# Patient Record
Sex: MALE | Race: OTHER | NOT HISPANIC OR LATINO
[De-identification: names, ages, dates, MRNs, and addresses within clinical notes are randomized per-mention and may not be internally consistent; named-entity substitution may affect disease eponyms.]

---

## 2020-12-30 ENCOUNTER — LABORATORY RESULT (OUTPATIENT)
Age: 45
End: 2020-12-30

## 2020-12-30 ENCOUNTER — APPOINTMENT (OUTPATIENT)
Dept: INTERNAL MEDICINE | Facility: CLINIC | Age: 45
End: 2020-12-30

## 2020-12-30 ENCOUNTER — NON-APPOINTMENT (OUTPATIENT)
Age: 45
End: 2020-12-30

## 2020-12-30 ENCOUNTER — OUTPATIENT (OUTPATIENT)
Dept: OUTPATIENT SERVICES | Facility: HOSPITAL | Age: 45
LOS: 1 days | End: 2020-12-30
Payer: SELF-PAY

## 2020-12-30 VITALS
BODY MASS INDEX: 29.06 KG/M2 | HEART RATE: 79 BPM | SYSTOLIC BLOOD PRESSURE: 130 MMHG | HEIGHT: 60 IN | OXYGEN SATURATION: 97 % | WEIGHT: 148 LBS | DIASTOLIC BLOOD PRESSURE: 80 MMHG

## 2020-12-30 DIAGNOSIS — I10 ESSENTIAL (PRIMARY) HYPERTENSION: ICD-10-CM

## 2020-12-30 DIAGNOSIS — Z00.00 ENCOUNTER FOR GENERAL ADULT MEDICAL EXAMINATION W/OUT ABNORMAL FINDINGS: ICD-10-CM

## 2020-12-30 PROCEDURE — 83036 HEMOGLOBIN GLYCOSYLATED A1C: CPT

## 2020-12-30 PROCEDURE — G0463: CPT

## 2020-12-30 PROCEDURE — 80053 COMPREHEN METABOLIC PANEL: CPT

## 2020-12-30 PROCEDURE — 85027 COMPLETE CBC AUTOMATED: CPT

## 2020-12-30 PROCEDURE — 80061 LIPID PANEL: CPT

## 2020-12-31 LAB
A1C WITH ESTIMATED AVERAGE GLUCOSE RESULT: 5.3 % — SIGNIFICANT CHANGE UP (ref 4–5.6)
ALBUMIN SERPL ELPH-MCNC: 4.4 G/DL — SIGNIFICANT CHANGE UP (ref 3.3–5)
ALP SERPL-CCNC: 87 U/L — SIGNIFICANT CHANGE UP (ref 40–120)
ALT FLD-CCNC: 26 U/L — SIGNIFICANT CHANGE UP (ref 10–45)
ANION GAP SERPL CALC-SCNC: 15 MMOL/L — SIGNIFICANT CHANGE UP (ref 5–17)
AST SERPL-CCNC: 21 U/L — SIGNIFICANT CHANGE UP (ref 10–40)
BILIRUB SERPL-MCNC: 0.2 MG/DL — SIGNIFICANT CHANGE UP (ref 0.2–1.2)
BUN SERPL-MCNC: 12 MG/DL — SIGNIFICANT CHANGE UP (ref 7–23)
CALCIUM SERPL-MCNC: 9.1 MG/DL — SIGNIFICANT CHANGE UP (ref 8.4–10.5)
CHLORIDE SERPL-SCNC: 102 MMOL/L — SIGNIFICANT CHANGE UP (ref 96–108)
CHOLEST SERPL-MCNC: 196 MG/DL — SIGNIFICANT CHANGE UP
CO2 SERPL-SCNC: 22 MMOL/L — SIGNIFICANT CHANGE UP (ref 22–31)
CREAT SERPL-MCNC: 0.91 MG/DL — SIGNIFICANT CHANGE UP (ref 0.5–1.3)
ESTIMATED AVERAGE GLUCOSE: 105 MG/DL — SIGNIFICANT CHANGE UP (ref 68–114)
GLUCOSE SERPL-MCNC: 71 MG/DL — SIGNIFICANT CHANGE UP (ref 70–99)
HCT VFR BLD CALC: 47.5 % — SIGNIFICANT CHANGE UP (ref 39–50)
HDLC SERPL-MCNC: 37 MG/DL — LOW
HGB BLD-MCNC: 15 G/DL — SIGNIFICANT CHANGE UP (ref 13–17)
LIPID PNL WITH DIRECT LDL SERPL: 106 MG/DL — HIGH
MCHC RBC-ENTMCNC: 30.2 PG — SIGNIFICANT CHANGE UP (ref 27–34)
MCHC RBC-ENTMCNC: 31.6 GM/DL — LOW (ref 32–36)
MCV RBC AUTO: 95.6 FL — SIGNIFICANT CHANGE UP (ref 80–100)
NON HDL CHOLESTEROL: 158 MG/DL — HIGH
PLATELET # BLD AUTO: 232 K/UL — SIGNIFICANT CHANGE UP (ref 150–400)
POTASSIUM SERPL-MCNC: 5.2 MMOL/L — SIGNIFICANT CHANGE UP (ref 3.5–5.3)
POTASSIUM SERPL-SCNC: 5.2 MMOL/L — SIGNIFICANT CHANGE UP (ref 3.5–5.3)
PROT SERPL-MCNC: 7.1 G/DL — SIGNIFICANT CHANGE UP (ref 6–8.3)
RBC # BLD: 4.97 M/UL — SIGNIFICANT CHANGE UP (ref 4.2–5.8)
RBC # FLD: 12.4 % — SIGNIFICANT CHANGE UP (ref 10.3–14.5)
SODIUM SERPL-SCNC: 139 MMOL/L — SIGNIFICANT CHANGE UP (ref 135–145)
TRIGL SERPL-MCNC: 259 MG/DL — HIGH
WBC # BLD: 5.49 K/UL — SIGNIFICANT CHANGE UP (ref 3.8–10.5)
WBC # FLD AUTO: 5.49 K/UL — SIGNIFICANT CHANGE UP (ref 3.8–10.5)

## 2020-12-31 NOTE — HISTORY OF PRESENT ILLNESS
[[employer] ] : provided by [employer]   [FreeTextEntry1] : [id number] [TWNoteComboBox1] : [place]riot [de-identified] : Patient is a 45yoM from [place] (20 yrs ago) with no PMHx here to establish care. Denies any major family history, surgical history, medication use. Never smoker, rare EtOH use, no illicits. Lives with family, one dog. Covid + in March, with mild sequelae (SOB at night, mucous). \par \par Only medical complaint is a diffuse rash x 1month, worst on the back, Denies changes in soaps, detergents, environments; rest of family without rash.

## 2020-12-31 NOTE — ASSESSMENT
[FreeTextEntry1] : Patient is a 44yo M without major medical history who presents to establish care and assess a diffuse rash x 1 month. Unsure of etiology of rash, though suspect histamine-mediated process/eczematous process.

## 2020-12-31 NOTE — REVIEW OF SYSTEMS
[Itching] : itching [Skin Rash] : skin rash [Fever] : no fever [Chills] : no chills [Pain] : no pain [Vision Problems] : no vision problems [Nasal Discharge] : no nasal discharge [Sore Throat] : no sore throat [Chest Pain] : no chest pain [Palpitations] : no palpitations [Lower Ext Edema] : no lower extremity edema [Shortness Of Breath] : no shortness of breath [Cough] : no cough [Dyspnea on Exertion] : not dyspnea on exertion [Abdominal Pain] : no abdominal pain [Nausea] : no nausea [Vomiting] : no vomiting [Dysuria] : no dysuria [Incontinence] : no incontinence [Joint Pain] : no joint pain [Muscle Pain] : no muscle pain [Back Pain] : no back pain [Headache] : no headache [Dizziness] : no dizziness [Anxiety] : no anxiety [Depression] : no depression

## 2020-12-31 NOTE — PLAN
[FreeTextEntry1] : #HCM \par -CBC, CMP, A1C, lipid today\par -declining flu shot \par \par #Rash\par -recommend zyrtec 10 qhs and pepcid 20mg BID for now to bridge to derm appt\par -derm consult provided\par -if patient unable to see derm in a reasonable time frame, discuss with Dr. Moore re: possibly starting mometasone\par \par \par RTC in 1 year

## 2020-12-31 NOTE — PHYSICAL EXAM
[No Acute Distress] : no acute distress [Well Nourished] : well nourished [Well Developed] : well developed [Well-Appearing] : well-appearing [Normal Sclera/Conjunctiva] : normal sclera/conjunctiva [PERRL] : pupils equal round and reactive to light [EOMI] : extraocular movements intact [Normal Outer Ear/Nose] : the outer ears and nose were normal in appearance [Normal Oropharynx] : the oropharynx was normal [No Lymphadenopathy] : no lymphadenopathy [Supple] : supple [No Respiratory Distress] : no respiratory distress  [Clear to Auscultation] : lungs were clear to auscultation bilaterally [Normal Rate] : normal rate  [Regular Rhythm] : with a regular rhythm [No Murmur] : no murmur heard [Pedal Pulses Present] : the pedal pulses are present [No Edema] : there was no peripheral edema [Soft] : abdomen soft [Non Tender] : non-tender [Non-distended] : non-distended [Normal Bowel Sounds] : normal bowel sounds [Normal Posterior Cervical Nodes] : no posterior cervical lymphadenopathy [Normal Anterior Cervical Nodes] : no anterior cervical lymphadenopathy [No Spinal Tenderness] : no spinal tenderness [No Joint Swelling] : no joint swelling [Grossly Normal Strength/Tone] : grossly normal strength/tone [No Focal Deficits] : no focal deficits [Normal Gait] : normal gait [Normal Affect] : the affect was normal [Normal Insight/Judgement] : insight and judgment were intact [de-identified] : Maculopapular rash on back, arms, legs.

## 2021-01-06 DIAGNOSIS — L25.9 UNSPECIFIED CONTACT DERMATITIS, UNSPECIFIED CAUSE: ICD-10-CM

## 2021-01-29 ENCOUNTER — APPOINTMENT (OUTPATIENT)
Dept: DERMATOLOGY | Facility: CLINIC | Age: 46
End: 2021-01-29

## 2021-02-08 ENCOUNTER — NON-APPOINTMENT (OUTPATIENT)
Age: 46
End: 2021-02-08

## 2021-02-08 NOTE — DISCUSSION/SUMMARY
[ER: _____] : ER: [unfilled] [FreeTextEntry1] : Received task that patient visited [place] ED. Called patient and was directed to  service. Left VM in Icelandic with instructions to call clinic to schedule follow up appointment if there were unresolved issues from ED visit that he would like to be evaluated for\par \chris Tubbs MD, PGY-2\par Internal Medicine

## 2021-02-25 ENCOUNTER — EMERGENCY (EMERGENCY)
Facility: HOSPITAL | Age: 46
LOS: 1 days | Discharge: HOME | End: 2021-02-25
Attending: EMERGENCY MEDICINE
Payer: MEDICAID

## 2021-02-25 VITALS
OXYGEN SATURATION: 98 % | SYSTOLIC BLOOD PRESSURE: 115 MMHG | HEART RATE: 69 BPM | DIASTOLIC BLOOD PRESSURE: 75 MMHG | RESPIRATION RATE: 12 BRPM

## 2021-02-25 VITALS
RESPIRATION RATE: 16 BRPM | TEMPERATURE: 99 F | DIASTOLIC BLOOD PRESSURE: 82 MMHG | HEIGHT: 64 IN | HEART RATE: 88 BPM | WEIGHT: 149.91 LBS | OXYGEN SATURATION: 97 % | SYSTOLIC BLOOD PRESSURE: 130 MMHG

## 2021-02-25 LAB
ALBUMIN SERPL ELPH-MCNC: 4.3 G/DL — SIGNIFICANT CHANGE UP (ref 3.3–5)
ALP SERPL-CCNC: 88 U/L — SIGNIFICANT CHANGE UP (ref 40–120)
ALT FLD-CCNC: 33 U/L — SIGNIFICANT CHANGE UP (ref 10–45)
ANION GAP SERPL CALC-SCNC: 12 MMOL/L — SIGNIFICANT CHANGE UP (ref 5–17)
APTT BLD: 32.7 SEC — SIGNIFICANT CHANGE UP (ref 27.5–35.5)
AST SERPL-CCNC: 28 U/L — SIGNIFICANT CHANGE UP (ref 10–40)
BASOPHILS # BLD AUTO: 0.03 K/UL — SIGNIFICANT CHANGE UP (ref 0–0.2)
BASOPHILS NFR BLD AUTO: 0.7 % — SIGNIFICANT CHANGE UP (ref 0–2)
BILIRUB SERPL-MCNC: 0.5 MG/DL — SIGNIFICANT CHANGE UP (ref 0.2–1.2)
BUN SERPL-MCNC: 12 MG/DL — SIGNIFICANT CHANGE UP (ref 7–23)
CALCIUM SERPL-MCNC: 9.4 MG/DL — SIGNIFICANT CHANGE UP (ref 8.4–10.5)
CHLORIDE SERPL-SCNC: 102 MMOL/L — SIGNIFICANT CHANGE UP (ref 96–108)
CO2 SERPL-SCNC: 24 MMOL/L — SIGNIFICANT CHANGE UP (ref 22–31)
CREAT SERPL-MCNC: 0.79 MG/DL — SIGNIFICANT CHANGE UP (ref 0.5–1.3)
EOSINOPHIL # BLD AUTO: 0.15 K/UL — SIGNIFICANT CHANGE UP (ref 0–0.5)
EOSINOPHIL NFR BLD AUTO: 3.4 % — SIGNIFICANT CHANGE UP (ref 0–6)
GLUCOSE SERPL-MCNC: 105 MG/DL — HIGH (ref 70–99)
HCT VFR BLD CALC: 44.2 % — SIGNIFICANT CHANGE UP (ref 39–50)
HGB BLD-MCNC: 15 G/DL — SIGNIFICANT CHANGE UP (ref 13–17)
IMM GRANULOCYTES NFR BLD AUTO: 0.5 % — SIGNIFICANT CHANGE UP (ref 0–1.5)
INR BLD: 1.04 RATIO — SIGNIFICANT CHANGE UP (ref 0.88–1.16)
LYMPHOCYTES # BLD AUTO: 1.52 K/UL — SIGNIFICANT CHANGE UP (ref 1–3.3)
LYMPHOCYTES # BLD AUTO: 34.6 % — SIGNIFICANT CHANGE UP (ref 13–44)
MCHC RBC-ENTMCNC: 30.4 PG — SIGNIFICANT CHANGE UP (ref 27–34)
MCHC RBC-ENTMCNC: 33.9 GM/DL — SIGNIFICANT CHANGE UP (ref 32–36)
MCV RBC AUTO: 89.7 FL — SIGNIFICANT CHANGE UP (ref 80–100)
MONOCYTES # BLD AUTO: 0.37 K/UL — SIGNIFICANT CHANGE UP (ref 0–0.9)
MONOCYTES NFR BLD AUTO: 8.4 % — SIGNIFICANT CHANGE UP (ref 2–14)
NEUTROPHILS # BLD AUTO: 2.3 K/UL — SIGNIFICANT CHANGE UP (ref 1.8–7.4)
NEUTROPHILS NFR BLD AUTO: 52.4 % — SIGNIFICANT CHANGE UP (ref 43–77)
NRBC # BLD: 0 /100 WBCS — SIGNIFICANT CHANGE UP (ref 0–0)
PLATELET # BLD AUTO: 205 K/UL — SIGNIFICANT CHANGE UP (ref 150–400)
POTASSIUM SERPL-MCNC: 3.8 MMOL/L — SIGNIFICANT CHANGE UP (ref 3.5–5.3)
POTASSIUM SERPL-SCNC: 3.8 MMOL/L — SIGNIFICANT CHANGE UP (ref 3.5–5.3)
PROT SERPL-MCNC: 7.2 G/DL — SIGNIFICANT CHANGE UP (ref 6–8.3)
PROTHROM AB SERPL-ACNC: 12.5 SEC — SIGNIFICANT CHANGE UP (ref 10.6–13.6)
RBC # BLD: 4.93 M/UL — SIGNIFICANT CHANGE UP (ref 4.2–5.8)
RBC # FLD: 11.9 % — SIGNIFICANT CHANGE UP (ref 10.3–14.5)
SODIUM SERPL-SCNC: 138 MMOL/L — SIGNIFICANT CHANGE UP (ref 135–145)
TROPONIN T, HIGH SENSITIVITY RESULT: <6 NG/L — SIGNIFICANT CHANGE UP (ref 0–51)
WBC # BLD: 4.39 K/UL — SIGNIFICANT CHANGE UP (ref 3.8–10.5)
WBC # FLD AUTO: 4.39 K/UL — SIGNIFICANT CHANGE UP (ref 3.8–10.5)

## 2021-02-25 PROCEDURE — 85025 COMPLETE CBC W/AUTO DIFF WBC: CPT

## 2021-02-25 PROCEDURE — 70496 CT ANGIOGRAPHY HEAD: CPT | Mod: 26,MA

## 2021-02-25 PROCEDURE — 80053 COMPREHEN METABOLIC PANEL: CPT

## 2021-02-25 PROCEDURE — 70496 CT ANGIOGRAPHY HEAD: CPT

## 2021-02-25 PROCEDURE — 99285 EMERGENCY DEPT VISIT HI MDM: CPT

## 2021-02-25 PROCEDURE — 84484 ASSAY OF TROPONIN QUANT: CPT

## 2021-02-25 PROCEDURE — 70450 CT HEAD/BRAIN W/O DYE: CPT

## 2021-02-25 PROCEDURE — 85610 PROTHROMBIN TIME: CPT

## 2021-02-25 PROCEDURE — 70498 CT ANGIOGRAPHY NECK: CPT

## 2021-02-25 PROCEDURE — 85730 THROMBOPLASTIN TIME PARTIAL: CPT

## 2021-02-25 PROCEDURE — 71046 X-RAY EXAM CHEST 2 VIEWS: CPT

## 2021-02-25 PROCEDURE — 93005 ELECTROCARDIOGRAM TRACING: CPT

## 2021-02-25 PROCEDURE — 70498 CT ANGIOGRAPHY NECK: CPT | Mod: 26,MA

## 2021-02-25 PROCEDURE — 99284 EMERGENCY DEPT VISIT MOD MDM: CPT | Mod: 25

## 2021-02-25 PROCEDURE — 93010 ELECTROCARDIOGRAM REPORT: CPT

## 2021-02-25 PROCEDURE — 71046 X-RAY EXAM CHEST 2 VIEWS: CPT | Mod: 26

## 2021-02-25 RX ORDER — MECLIZINE HCL 12.5 MG
25 TABLET ORAL ONCE
Refills: 0 | Status: COMPLETED | OUTPATIENT
Start: 2021-02-25 | End: 2021-02-25

## 2021-02-25 RX ORDER — MECLIZINE HCL 12.5 MG
1 TABLET ORAL
Qty: 21 | Refills: 0
Start: 2021-02-25 | End: 2021-03-03

## 2021-02-25 RX ADMIN — Medication 25 MILLIGRAM(S): at 16:17

## 2021-02-25 NOTE — ED ADULT NURSE NOTE - CHIEF COMPLAINT QUOTE
CP, dizziness and feels off balance x 3 weeks, was seen at hospital  [facility] appt April 9th with neurologist

## 2021-02-25 NOTE — ED PROVIDER NOTE - OBJECTIVE STATEMENT
44 y/o M w/ PMHx of HLD p/w increasing frequent and longer episodes of headache. Pt reports accompanying unsteadiness, blurriness of vision, N/V. Denies diplopia or focal deficits. After ep's pt experiences a burning L pectoral CP. Last episode yd was the longest 1 hr in length prompting pt to come in today. Pt currently reports some unsteadiness. Of note, pt has an appointment w/ a neurologist in 2 mo. : J Luis #[id number] 44 y/o M w/ PMHx of HLD p/w increasing frequent and longer episodes of headache. Pt reports accompanying unsteadiness, blurriness of vision, N/V. Denies diplopia or focal deficits. After ep's pt experiences a burning L pectoral CP. Last episode yd was the longest 1 hr in length prompting pt to come in today. Pt currently reports some unsteadiness. Of note, pt has an appointment w/ a neurologist in 2 mo. : J Luis #[id number]    Remi Castro NP-C:   # [id number]- PT awake, alert orientedx3 states that he has been feeling intermittent dizziness for the past three months along with 9/10 dull headaches. Pt was seen at [facility] ER and was told to follow up with a neurologist appointment but his appointment isnot for several weeks.   PT states "I feel like I am drink when I am walking and feel like I am going to fall over.   Sometimes I get pain in my chest muscle and then it goes away.  I have not had any pain in my chest for a few days". Denies fevers and chills.  DEnies shortness of breaht at this time.

## 2021-02-25 NOTE — ED ADULT TRIAGE NOTE - CHIEF COMPLAINT QUOTE
CP, dizziness and feels off balance x 3 weeks, was seen at hospital  [facility] appt April 9th with neurologist

## 2021-02-25 NOTE — ED PROVIDER NOTE - PHYSICAL EXAMINATION
VITALS:   T(C): 37.1 (02-25-21 @ 10:51), Max: 37.1 (02-25-21 @ 10:51)  HR: 68 (02-25-21 @ 11:50) (68 - 88)  BP: 124/81 (02-25-21 @ 11:50) (124/81 - 130/82)  RR: 16 (02-25-21 @ 11:50) (16 - 16)  SpO2: 98% (02-25-21 @ 11:50) (97% - 98%)    GENERAL: NAD, lying in bed comfortably  HEAD:  Atraumatic, Normocephalic  EYES: EOMI, +pupils equal, round, dilated, sluggish , conjunctiva and sclera clear. ?nystagmus   ENT: Moist mucous membranes  NECK: Supple, No JVD  CHEST/LUNG: Clear to auscultation bilaterally; No rales, rhonchi, wheezing, or rubs. Unlabored respirations  HEART: Regular rate and rhythm; No murmurs, rubs, or gallops  ABDOMEN: Bowel sounds present; Soft, Nontender, Nondistended. No hepatomegally  EXTREMITIES:  2+ Peripheral Pulses, brisk capillary refill. No clubbing, cyanosis, or edema  NERVOUS SYSTEM:  Alert & Oriented X3, speech clear. No deficits   MSK: FROM all 4 extremities, full and equal strength  SKIN: No rashes or lesions

## 2021-02-25 NOTE — ED PROVIDER NOTE - PROGRESS NOTE DETAILS
PASCUAL Nunez MD: Pt felt better after meclizine, sx resolved, feels comfortable going home with outpt f/u and strict return precautions.

## 2021-02-25 NOTE — ED PROVIDER NOTE - NS ED ROS FT
REVIEW OF SYSTEMS:    CONSTITUTIONAL: No weakness, fevers or chills  EYES/ENT: + visual changes, + unsteadiness  NECK: No pain or stiffness  RESPIRATORY: No cough, wheezing, hemoptysis; No shortness of breath  CARDIOVASCULAR: as noted in HPI  GASTROINTESTINAL: No abdominal or epigastric pain. No diarrhea or constipation. No melena or hematochezia.  GENITOURINARY: No dysuria, frequency or hematuria  NEUROLOGICAL: No numbness or weakness  All other review of systems is negative unless indicated above.

## 2021-02-25 NOTE — ED PROVIDER NOTE - PATIENT PORTAL LINK FT
You can access the FollowMyHealth Patient Portal offered by [facility] by registering at the following website: [email or web address]. By joining [employer]’s FollowMyHealth portal, you will also be able to view your health information using other applications (apps) compatible with our system.

## 2021-02-25 NOTE — ED ADULT TRIAGE NOTE - BMI (KG/M2)
25.7 [No Acute Distress] : no acute distress [Well Developed] : well developed [Well Nourished] : well nourished [Well-Appearing] : well-appearing [Normal Sclera/Conjunctiva] : normal sclera/conjunctiva [Normal Outer Ear/Nose] : the outer ears and nose were normal in appearance [No Lymphadenopathy] : no lymphadenopathy [Thyroid Normal, No Nodules] : the thyroid was normal and there were no nodules present [No Respiratory Distress] : no respiratory distress  [Supple] : supple [No Accessory Muscle Use] : no accessory muscle use [Normal Rate] : normal rate  [Clear to Auscultation] : lungs were clear to auscultation bilaterally [Regular Rhythm] : with a regular rhythm [Normal S1, S2] : normal S1 and S2 [No Murmur] : no murmur heard [No Carotid Bruits] : no carotid bruits [No Edema] : there was no peripheral edema [Soft] : abdomen soft [Non Tender] : non-tender [Non-distended] : non-distended [No Masses] : no abdominal mass palpated [Normal Bowel Sounds] : normal bowel sounds [Normal Supraclavicular Nodes] : no supraclavicular lymphadenopathy [Normal Axillary Nodes] : no axillary lymphadenopathy [Normal Posterior Cervical Nodes] : no posterior cervical lymphadenopathy [Normal Anterior Cervical Nodes] : no anterior cervical lymphadenopathy [Normal Inguinal Nodes] : no inguinal lymphadenopathy [Grossly Normal Strength/Tone] : grossly normal strength/tone [No Focal Deficits] : no focal deficits [Normal Affect] : the affect was normal [Normal Gait] : normal gait [Normal Insight/Judgement] : insight and judgment were intact

## 2021-02-25 NOTE — ED PROVIDER NOTE - CLINICAL SUMMARY MEDICAL DECISION MAKING FREE TEXT BOX
Pt with several months of feeling dizzy , seen at [facility] recently - as neuro f/u in two months- vertigo relieved with meclizine-  CTA ct head neg and ct neck neg-   - will f/u with [facility] Pt with several months of feeling dizzy , seen at [place] ER recently - as neuro f/u in two months- vertigo relieved with meclizine-  CTA ct head neg and ct neck neg-   - will f/u with [facility] neuro    PASCUAL Nunez MD: Pt is a 46 y/o male with PMH HLD who p/w c/o dizziness / vertigo type sx. Pt a/so c/o HA with this dizziness, and states his vision is sometimes blurry. Admits that dizziness is worsened by head mov't. Pt also reports previous h/o CP, most recent episode yesterday that lasted 1 hr. No CP at this time. Pt denies: current chest pain, SOB, cough, fevers, chills, pleuritic chest pain, abdominal pain, n/v/d, back pain, neck pain, neck stiffness, focal numbness or weakness, current visual changes, leg pain/swelling, recent travel, recent trauma, recent immobilization, dysuria, hematuria, rash. Exam performed by me demonstrates normal exam including cardiopulmonary and neurologic components. Ddx includes, however, is not limited to: peripheral vertigo, central vertigo, ACS, other. Less likely dissection given no active pain. Plan: basic labs, trop, 12-lead, CXR, CTA head/neck, meclizine and IVF, reassess. If no improvement in sx, may admit for further w/u

## 2021-02-25 NOTE — ED PROVIDER NOTE - NSFOLLOWUPINSTRUCTIONS_ED_ALL_ED_FT
1. Follow up with your primary care physician within 2-3days for reevaluation.  2.  Return to the Emergency Department for worsening, progressive or any other concerning symptoms.   3. Take meclizine 25 mg every 8hrs as needed 1. Follow up with your primary care physician within 2-3days for reevaluation.  2.  Return to the Emergency Department for worsening, progressive or any other concerning symptoms.   3. Take meclizine 25 mg every 8hrs as needed  This medication is waiting for you at [facility]  4. Stay well hydrated and drink plenty of water  5. Follow up with your neurology appointment. Also [facility] neurology will call you as well for an appointment

## 2021-02-25 NOTE — ED PROVIDER NOTE - ATTENDING CONTRIBUTION TO CARE
I saw and evaluated patient with ACP. I discussed H+P and MDM with ACP. I agree with the statements made by the ACP unless otherwise noted.    The care of this patient was in support of the [facility] countermeasures to Covid-19.

## 2021-02-25 NOTE — ED ADULT NURSE NOTE - OBJECTIVE STATEMENT
45M to ED c/o pain to the Left side of his chest, and also headache. Patient c/o feeling dizzy/off-balance for 3 weeks and is pending an outpatient neuro follow-up appointment that is in April. Patient is changed into hospital gown, put on cardiac monitor, NSR at this time. Patient is given the call bell and verbalizes understanding of how to use it. Patient refuses offer of blanket at this time. 18 gauge IV placed to L AC. 45M to ED c/o pain to the Left side of his chest, and also a headache. Patient states that the headache is is primary reason for visit today, at times accompanied with blurry vision. Patient c/o feeling dizzy/off-balance for 3 weeks and is pending an outpatient neuro follow-up appointment that is in April.  Patient c/o episodes, feeling like he is walking drunk even though he did not drink any alcohol. Patient also c/o dizzy, feeling like he's spinning, nausea, blurry vision with these episodes. Patient states yesterday he had an episode which lasted 1 hour and it made him scared.  Patient is changed into hospital gown, put on cardiac monitor, NSR at this time. Patient is able to ambulate at this time without assistance.  Patient is given the call bell and verbalizes understanding of how to use it. Patient refuses offer of blanket at this time. 18 gauge IV placed to L AC. 45M to ED c/o pain to the Left side of his chest, and also a headache. Patient states that the headache is is primary reason for visit today, at times accompanied with blurry vision. Patient c/o feeling dizzy/off-balance for 3 weeks and is pending an outpatient neuro follow-up appointment that is in April.  Patient c/o episodes, feeling like he is walking drunk even though he did not drink any alcohol. Patient also c/o dizzy, feeling like he's spinning, nausea, blurry vision with these episodes. Patient states yesterday he had an episode which lasted 1 hour and it made him scared.  Patient is changed into hospital gown, put on cardiac monitor, NSR at this time. Patient is able to ambulate at this time without assistance.  Patient is given the call bell and verbalizes understanding of how to use it. Patient refuses offer of blanket at this time. 18 gauge IV placed to L AC.   J Luis #[id number] used for translation 45M to ED c/o pain to the Left side of his chest, and also a headache. Patient states that the headache is primary reason for visit today, at times accompanied with blurry vision. Patient c/o feeling dizzy/off-balance for 3 weeks and is pending an outpatient neuro follow-up appointment that is in April.  Patient c/o episodes, feeling like he is walking drunk even though he did not drink any alcohol. Patient also c/o dizzy, feeling like he's spinning, nausea, blurry vision with these episodes. Patient states yesterday he had an episode which lasted 1 hour and it made him scared. Patient is alert and oriented x4, calm/cooperative, NAD, VSS.  Patient is changed into hospital gown, put on cardiac monitor, NSR at this time. Patient is able to ambulate at this time without assistance.  Patient is given the call bell and verbalizes understanding of how to use it. Patient refuses offer of blanket at this time. 18 gauge IV placed to L AC.   J Luis #[id number] used for translation

## 2021-02-25 NOTE — ED ADULT NURSE NOTE - NSIMPLEMENTINTERV_GEN_ALL_ED
Implemented All Universal Safety Interventions:  [place] to call system. Call bell, personal items and telephone within reach. Instruct patient to call for assistance. Room bathroom lighting operational. Non-slip footwear when patient is off stretcher. Physically safe environment: no spills, clutter or unnecessary equipment. Stretcher in lowest position, wheels locked, appropriate side rails in place.

## 2021-03-02 ENCOUNTER — NON-APPOINTMENT (OUTPATIENT)
Age: 46
End: 2021-03-02

## 2021-03-03 ENCOUNTER — OUTPATIENT (OUTPATIENT)
Dept: OUTPATIENT SERVICES | Facility: HOSPITAL | Age: 46
LOS: 1 days | End: 2021-03-03
Payer: SELF-PAY

## 2021-03-03 ENCOUNTER — APPOINTMENT (OUTPATIENT)
Dept: INTERNAL MEDICINE | Facility: CLINIC | Age: 46
End: 2021-03-03

## 2021-03-03 VITALS
SYSTOLIC BLOOD PRESSURE: 110 MMHG | HEART RATE: 67 BPM | OXYGEN SATURATION: 98 % | DIASTOLIC BLOOD PRESSURE: 70 MMHG | BODY MASS INDEX: 29.49 KG/M2 | WEIGHT: 146 LBS

## 2021-03-03 DIAGNOSIS — H61.20 IMPACTED CERUMEN, UNSPECIFIED EAR: ICD-10-CM

## 2021-03-03 DIAGNOSIS — I10 ESSENTIAL (PRIMARY) HYPERTENSION: ICD-10-CM

## 2021-03-03 DIAGNOSIS — H81.10 BENIGN PAROXYSMAL VERTIGO, UNSPECIFIED EAR: ICD-10-CM

## 2021-03-03 DIAGNOSIS — R51.9 HEADACHE, UNSPECIFIED: ICD-10-CM

## 2021-03-03 PROCEDURE — G0463: CPT

## 2021-03-25 NOTE — PHYSICAL EXAM
[No Acute Distress] : no acute distress [Well Nourished] : well nourished [Well Developed] : well developed [Normal Sclera/Conjunctiva] : normal sclera/conjunctiva [PERRL] : pupils equal round and reactive to light [EOMI] : extraocular movements intact [Normal Outer Ear/Nose] : the outer ears and nose were normal in appearance [No JVD] : no jugular venous distention [No Respiratory Distress] : no respiratory distress  [No Accessory Muscle Use] : no accessory muscle use [Clear to Auscultation] : lungs were clear to auscultation bilaterally [Normal Rate] : normal rate  [Regular Rhythm] : with a regular rhythm [Normal S1, S2] : normal S1 and S2 [Soft] : abdomen soft [Non Tender] : non-tender [Non-distended] : non-distended [No HSM] : no HSM [Normal Bowel Sounds] : normal bowel sounds [No CVA Tenderness] : no CVA  tenderness [No Joint Swelling] : no joint swelling [No Rash] : no rash [Coordination Grossly Intact] : coordination grossly intact [No Focal Deficits] : no focal deficits [Normal Gait] : normal gait [Deep Tendon Reflexes (DTR)] : deep tendon reflexes were 2+ and symmetric [Speech Grossly Normal] : speech grossly normal [Memory Grossly Normal] : memory grossly normal [Normal Mood] : the mood was normal [Normal Insight/Judgement] : insight and judgment were intact [de-identified] : +L cerumen impaction

## 2021-03-25 NOTE — HEALTH RISK ASSESSMENT
[Intercurrent ED visits] : went to ED [0-5] : 0-5 [No] : No [Never (0 pts)] : Never (0 points) [1 or 2 (0 pts)] : 1 or 2 (0 points) [0] : 2) Feeling down, depressed, or hopeless: Not at all (0) [] : No

## 2021-03-25 NOTE — HISTORY OF PRESENT ILLNESS
[FreeTextEntry8] : 44 yo M pmhx HLD who presents as ED follow up for dizziness. The following was taken from their evaluation \par \par Kiel : [id number]\par \par 46 y/o male with PMH HLD who p/w c/o dizziness / vertigo\par type sx. Pt a/so c/o HA with this dizziness, and states his vision is sometimes\par blurry. Admits that dizziness is worsened by head mov't. Pt also reports\par previous h/o CP, most recent episode yesterday that lasted 1 hr. No CP at this\par time. Pt denies: current chest pain, SOB, cough, fevers, chills, pleuritic\par chest pain, abdominal pain, n/v/d, back pain, neck pain, neck stiffness, focal\par numbness or weakness, current visual changes, leg pain/swelling, recent travel,\par recent trauma, recent immobilization, dysuria, hematuria, rash. Exam performed\par by me demonstrates normal exam including cardiopulmonary and neurologic\par components. Ddx includes, however, is not limited to: peripheral vertigo,\par central vertigo, ACS, other. Less likely dissection given no active pain. Plan:\par basic labs, trop, 12-lead, CXR, CTA head/neck, meclizine and IVF, reassess. If\par no improvement in sx, may admit for further w/u\par \par CTA head, neck performed all wnl. Patient's symptoms improved after trial of meclizine. Since this visit he has been taking the meclizine daily which is causing him to have some fatigue. Patient states he has not had any nausea symptoms since his ED visit though he stil has some mild forehead headaches. He had no proceeding viral illness. These symptoms are accompanied mainly with room spinning luda timproves after about 10 minutes. He is mainly afraid that his symptoms are similar to a stroke because his brother suffered one in his 20s.

## 2021-03-25 NOTE — REVIEW OF SYSTEMS
[Fever] : no fever [Chills] : no chills [Night Sweats] : no night sweats [Earache] : no earache [Hearing Loss] : no hearing loss [Sore Throat] : no sore throat [Chest Pain] : no chest pain [Palpitations] : no palpitations [Orthopena] : no orthopnea [Shortness Of Breath] : no shortness of breath [Wheezing] : no wheezing [Cough] : no cough [Abdominal Pain] : no abdominal pain [Dysuria] : no dysuria [Joint Pain] : no joint pain

## 2021-05-12 ENCOUNTER — APPOINTMENT (OUTPATIENT)
Dept: INTERNAL MEDICINE | Facility: CLINIC | Age: 46
End: 2021-05-12

## 2021-05-12 ENCOUNTER — OUTPATIENT (OUTPATIENT)
Dept: OUTPATIENT SERVICES | Facility: HOSPITAL | Age: 46
LOS: 1 days | End: 2021-05-12
Payer: SELF-PAY

## 2021-05-12 DIAGNOSIS — I10 ESSENTIAL (PRIMARY) HYPERTENSION: ICD-10-CM

## 2021-05-12 DIAGNOSIS — L25.9 UNSPECIFIED CONTACT DERMATITIS, UNSPECIFIED CAUSE: ICD-10-CM

## 2021-05-12 DIAGNOSIS — F41.0 PANIC DISORDER [EPISODIC PAROXYSMAL ANXIETY]: ICD-10-CM

## 2021-05-12 DIAGNOSIS — R51.9 HEADACHE, UNSPECIFIED: ICD-10-CM

## 2021-05-12 PROCEDURE — G0463: CPT

## 2021-05-12 RX ORDER — ESCITALOPRAM OXALATE 10 MG/1
10 TABLET ORAL DAILY
Qty: 30 | Refills: 0 | Status: ACTIVE | COMMUNITY
Start: 2021-05-12 | End: 1900-01-01

## 2021-05-12 NOTE — PHYSICAL EXAM
[Well Nourished] : well nourished [Well Developed] : well developed [Normal Sclera/Conjunctiva] : normal sclera/conjunctiva [PERRL] : pupils equal round and reactive to light [Normal Outer Ear/Nose] : the outer ears and nose were normal in appearance [No Lymphadenopathy] : no lymphadenopathy [Thyroid Normal, No Nodules] : the thyroid was normal and there were no nodules present [No Respiratory Distress] : no respiratory distress  [Clear to Auscultation] : lungs were clear to auscultation bilaterally [No Edema] : there was no peripheral edema [Soft] : abdomen soft [Non Tender] : non-tender [Non-distended] : non-distended [No Masses] : no abdominal mass palpated [No HSM] : no HSM [Normal Bowel Sounds] : normal bowel sounds

## 2021-05-13 PROBLEM — E78.00 PURE HYPERCHOLESTEROLEMIA, UNSPECIFIED: Chronic | Status: ACTIVE | Noted: 2021-02-25

## 2021-05-17 NOTE — ASSESSMENT
[FreeTextEntry1] : case dw Dr. Mena\par \par \par fu in 5 weeks. \par \par \par \par Jone Crawford, pgy 2

## 2021-05-17 NOTE — HISTORY OF PRESENT ILLNESS
[FreeTextEntry1] : : [id number]\par \par 110/60\par hr 70s [de-identified] : 44 y/o male with PMH HLD who p/w c/o dizziness / vertigo\par type sx. Pt a/so c/o HA with this dizziness, and states his vision is sometimes\par blurry. Patient seen on 3/3 for follow up of ED visit. At that visit CT A head and neck with benign findings. Patient's symptoms improved after trial of meclizine. Since this visit he has been taking the meclizine daily which is causing him to have some fatigue. Patient states he has not had any nausea symptoms since his ED visit though he stil has some mild forehead headaches. He had no proceeding viral illness. These symptoms are accompanied mainly with room spinning luda timproves after about 10 minutes. He is mainly afraid that his symptoms are similar to a stroke because his brother suffered one in his 20s.\par \par he states these episodes have continued slightly - happen around 2x a week for 20 minutes at a time. They go away on their own - associated with some sweating and he feels that he is in a panic. When he goes to bed he gets worried that these episodes are going to start again and he gets very scared. \par \par Also with rash noted on right arm and groin. uses scented soap.  \par

## 2021-05-25 DIAGNOSIS — R51.9 HEADACHE, UNSPECIFIED: ICD-10-CM

## 2021-05-25 DIAGNOSIS — F41.0 PANIC DISORDER [EPISODIC PAROXYSMAL ANXIETY]: ICD-10-CM

## 2021-05-25 DIAGNOSIS — L25.9 UNSPECIFIED CONTACT DERMATITIS, UNSPECIFIED CAUSE: ICD-10-CM

## 2021-06-24 ENCOUNTER — APPOINTMENT (OUTPATIENT)
Dept: NEUROLOGY | Facility: HOSPITAL | Age: 46
End: 2021-06-24

## 2023-04-17 NOTE — ED ADULT NURSE NOTE - NSSUHOSCREENINGYN_ED_ALL_ED
Jani Devine (MD)  EEGEpilepsy; Pediatric Neurology; Sleep Medicine  [address] [facility], [facility] [address]  [place] [address]  Phone: [phone]  Fax: [phone]  Follow Up Time:   
Yes - the patient is able to be screened